# Patient Record
Sex: FEMALE | Race: OTHER | NOT HISPANIC OR LATINO | URBAN - METROPOLITAN AREA
[De-identification: names, ages, dates, MRNs, and addresses within clinical notes are randomized per-mention and may not be internally consistent; named-entity substitution may affect disease eponyms.]

---

## 2019-05-20 NOTE — H&P ADULT - NSICDXPASTSURGICALHX_GEN_ALL_CORE_FT
PAST SURGICAL HISTORY:  History of cataract extraction     History of colectomy     History of lumpectomy PAST SURGICAL HISTORY:  H/O left wrist surgery with hardware    History of      History of cataract extraction     History of colectomy     History of colostomy reversal     History of lumpectomy left breast with lymph nodes removed    History of parathyroidectomy     History of surgery right leg calcium deposit removed    History of tonsillectomy

## 2019-05-20 NOTE — H&P ADULT - HISTORY OF PRESENT ILLNESS
78yo female co right hip pain x  .   Patient presents for elective R ELLA. 78yo female co right hip pain x 1-2y. Pt. denies any accident or injury but states she has arthritis. Pt. c/o increased pain in with sitting to standing positions, but gets better once she starts to walk. Pt. takes no medications to alleviate pain due to stomach issues. Denies any PT or injections. Denies any walker assistance.    Patient presents for elective R ELLA.

## 2019-05-20 NOTE — H&P ADULT - NSICDXPASTMEDICALHX_GEN_ALL_CORE_FT
PAST MEDICAL HISTORY:  Breast cancer     Depression     Esophagitis     Essential hypertension     GERD (gastroesophageal reflux disease)     Osteoporosis PAST MEDICAL HISTORY:  Breast cancer left breast    Depression     Diverticulitis prior colostomy, since reversed    Esophagitis     Essential hypertension     GERD (gastroesophageal reflux disease)     Osteoarthritis     Osteoporosis

## 2019-05-20 NOTE — H&P ADULT - NSHPLABSRESULTS_GEN_ALL_CORE
Preop cbc, bmp, pt/inr, ptt, wnl reviewed by medical clearance.  Nasal swab DOS  Preop UA +leuks repeat DOS  preop cxr Nodular right apical pleural thickening with linear atelectasis or scarring reviewed by medical clearance  preop ekg wnl reviewed by medical clearance

## 2019-05-20 NOTE — H&P ADULT - NSHPPHYSICALEXAM_GEN_ALL_CORE
General: Alert and oriented, NAD  MSK:  Decreased ROM of right hip secondary to pain.  Remainder of PE as per medical clearance. General: Alert and oriented, NAD  MSK: RIGHT HIP: skin intact, no erythema/ecchymosis/ sts. SLT INTACT, DP/PT 2+, EHL/TA/GS 5/5.   Decreased ROM of right hip secondary to pain.  Remainder of PE as per medical clearance.

## 2019-05-20 NOTE — H&P ADULT - PROBLEM SELECTOR PLAN 1
Admit to orthopedics.  Presents for elective R ELLA  Medically optimized and cleared for surgery by Dr. Lozada

## 2019-05-21 ENCOUNTER — INPATIENT (INPATIENT)
Facility: HOSPITAL | Age: 79
LOS: 0 days | Discharge: HOME HEALTH | DRG: 470 | End: 2019-05-22
Attending: ORTHOPAEDIC SURGERY | Admitting: ORTHOPAEDIC SURGERY
Payer: MEDICARE

## 2019-05-21 VITALS
SYSTOLIC BLOOD PRESSURE: 138 MMHG | DIASTOLIC BLOOD PRESSURE: 54 MMHG | RESPIRATION RATE: 18 BRPM | OXYGEN SATURATION: 98 % | HEIGHT: 64 IN | TEMPERATURE: 98 F | HEART RATE: 54 BPM | WEIGHT: 175.05 LBS

## 2019-05-21 DIAGNOSIS — Z98.890 OTHER SPECIFIED POSTPROCEDURAL STATES: Chronic | ICD-10-CM

## 2019-05-21 DIAGNOSIS — K20.9 ESOPHAGITIS, UNSPECIFIED: ICD-10-CM

## 2019-05-21 DIAGNOSIS — M16.11 UNILATERAL PRIMARY OSTEOARTHRITIS, RIGHT HIP: ICD-10-CM

## 2019-05-21 DIAGNOSIS — M81.0 AGE-RELATED OSTEOPOROSIS WITHOUT CURRENT PATHOLOGICAL FRACTURE: ICD-10-CM

## 2019-05-21 DIAGNOSIS — Z90.89 ACQUIRED ABSENCE OF OTHER ORGANS: Chronic | ICD-10-CM

## 2019-05-21 DIAGNOSIS — Z98.49 CATARACT EXTRACTION STATUS, UNSPECIFIED EYE: Chronic | ICD-10-CM

## 2019-05-21 DIAGNOSIS — Z98.891 HISTORY OF UTERINE SCAR FROM PREVIOUS SURGERY: Chronic | ICD-10-CM

## 2019-05-21 DIAGNOSIS — Z90.49 ACQUIRED ABSENCE OF OTHER SPECIFIED PARTS OF DIGESTIVE TRACT: Chronic | ICD-10-CM

## 2019-05-21 DIAGNOSIS — K21.9 GASTRO-ESOPHAGEAL REFLUX DISEASE WITHOUT ESOPHAGITIS: ICD-10-CM

## 2019-05-21 DIAGNOSIS — F32.9 MAJOR DEPRESSIVE DISORDER, SINGLE EPISODE, UNSPECIFIED: ICD-10-CM

## 2019-05-21 DIAGNOSIS — I10 ESSENTIAL (PRIMARY) HYPERTENSION: ICD-10-CM

## 2019-05-21 LAB
ANION GAP SERPL CALC-SCNC: 11 MMOL/L — SIGNIFICANT CHANGE UP (ref 5–17)
APPEARANCE UR: CLEAR — SIGNIFICANT CHANGE UP
BACTERIA # UR AUTO: PRESENT /HPF
BILIRUB UR-MCNC: NEGATIVE — SIGNIFICANT CHANGE UP
BUN SERPL-MCNC: 17 MG/DL — SIGNIFICANT CHANGE UP (ref 7–23)
CALCIUM SERPL-MCNC: 9.1 MG/DL — SIGNIFICANT CHANGE UP (ref 8.4–10.5)
CHLORIDE SERPL-SCNC: 108 MMOL/L — SIGNIFICANT CHANGE UP (ref 96–108)
CO2 SERPL-SCNC: 22 MMOL/L — SIGNIFICANT CHANGE UP (ref 22–31)
COLOR SPEC: YELLOW — SIGNIFICANT CHANGE UP
CREAT SERPL-MCNC: 0.62 MG/DL — SIGNIFICANT CHANGE UP (ref 0.5–1.3)
DIFF PNL FLD: ABNORMAL
EPI CELLS # UR: SIGNIFICANT CHANGE UP /HPF (ref 0–5)
GLUCOSE SERPL-MCNC: 145 MG/DL — HIGH (ref 70–99)
GLUCOSE UR QL: NEGATIVE — SIGNIFICANT CHANGE UP
HCT VFR BLD CALC: 35.7 % — SIGNIFICANT CHANGE UP (ref 34.5–45)
HGB BLD-MCNC: 12 G/DL — SIGNIFICANT CHANGE UP (ref 11.5–15.5)
KETONES UR-MCNC: NEGATIVE — SIGNIFICANT CHANGE UP
LEUKOCYTE ESTERASE UR-ACNC: ABNORMAL
MCHC RBC-ENTMCNC: 32.3 PG — SIGNIFICANT CHANGE UP (ref 27–34)
MCHC RBC-ENTMCNC: 33.6 GM/DL — SIGNIFICANT CHANGE UP (ref 32–36)
MCV RBC AUTO: 96 FL — SIGNIFICANT CHANGE UP (ref 80–100)
MRSA PCR RESULT.: NEGATIVE — SIGNIFICANT CHANGE UP
NITRITE UR-MCNC: NEGATIVE — SIGNIFICANT CHANGE UP
NRBC # BLD: 0 /100 WBCS — SIGNIFICANT CHANGE UP (ref 0–0)
PH UR: 6 — SIGNIFICANT CHANGE UP (ref 5–8)
PLATELET # BLD AUTO: 156 K/UL — SIGNIFICANT CHANGE UP (ref 150–400)
POTASSIUM SERPL-MCNC: 4.2 MMOL/L — SIGNIFICANT CHANGE UP (ref 3.5–5.3)
POTASSIUM SERPL-SCNC: 4.2 MMOL/L — SIGNIFICANT CHANGE UP (ref 3.5–5.3)
PROT UR-MCNC: NEGATIVE MG/DL — SIGNIFICANT CHANGE UP
RBC # BLD: 3.72 M/UL — LOW (ref 3.8–5.2)
RBC # FLD: 12.9 % — SIGNIFICANT CHANGE UP (ref 10.3–14.5)
RBC CASTS # UR COMP ASSIST: < 5 /HPF — SIGNIFICANT CHANGE UP
S AUREUS DNA NOSE QL NAA+PROBE: POSITIVE
SODIUM SERPL-SCNC: 141 MMOL/L — SIGNIFICANT CHANGE UP (ref 135–145)
SP GR SPEC: <=1.005 — SIGNIFICANT CHANGE UP (ref 1–1.03)
TROPONIN T SERPL-MCNC: <0.01 NG/ML — SIGNIFICANT CHANGE UP (ref 0–0.01)
UROBILINOGEN FLD QL: 0.2 E.U./DL — SIGNIFICANT CHANGE UP
WBC # BLD: 7.34 K/UL — SIGNIFICANT CHANGE UP (ref 3.8–10.5)
WBC # FLD AUTO: 7.34 K/UL — SIGNIFICANT CHANGE UP (ref 3.8–10.5)
WBC UR QL: < 5 /HPF — SIGNIFICANT CHANGE UP

## 2019-05-21 PROCEDURE — 99223 1ST HOSP IP/OBS HIGH 75: CPT

## 2019-05-21 PROCEDURE — 72170 X-RAY EXAM OF PELVIS: CPT | Mod: 26

## 2019-05-21 RX ORDER — ESCITALOPRAM OXALATE 10 MG/1
0 TABLET, FILM COATED ORAL
Qty: 0 | Refills: 0 | DISCHARGE

## 2019-05-21 RX ORDER — ACETAMINOPHEN 500 MG
975 TABLET ORAL ONCE
Refills: 0 | Status: COMPLETED | OUTPATIENT
Start: 2019-05-21 | End: 2019-05-21

## 2019-05-21 RX ORDER — MAGNESIUM HYDROXIDE 400 MG/1
30 TABLET, CHEWABLE ORAL DAILY
Refills: 0 | Status: DISCONTINUED | OUTPATIENT
Start: 2019-05-21 | End: 2019-05-22

## 2019-05-21 RX ORDER — AMLODIPINE BESYLATE 2.5 MG/1
2.5 TABLET ORAL DAILY
Refills: 0 | Status: DISCONTINUED | OUTPATIENT
Start: 2019-05-21 | End: 2019-05-22

## 2019-05-21 RX ORDER — ESCITALOPRAM OXALATE 10 MG/1
5 TABLET, FILM COATED ORAL DAILY
Refills: 0 | Status: DISCONTINUED | OUTPATIENT
Start: 2019-05-21 | End: 2019-05-22

## 2019-05-21 RX ORDER — GABAPENTIN 400 MG/1
100 CAPSULE ORAL THREE TIMES A DAY
Refills: 0 | Status: DISCONTINUED | OUTPATIENT
Start: 2019-05-21 | End: 2019-05-22

## 2019-05-21 RX ORDER — ONDANSETRON 8 MG/1
4 TABLET, FILM COATED ORAL EVERY 6 HOURS
Refills: 0 | Status: DISCONTINUED | OUTPATIENT
Start: 2019-05-21 | End: 2019-05-22

## 2019-05-21 RX ORDER — PANTOPRAZOLE SODIUM 20 MG/1
40 TABLET, DELAYED RELEASE ORAL
Refills: 0 | Status: DISCONTINUED | OUTPATIENT
Start: 2019-05-21 | End: 2019-05-21

## 2019-05-21 RX ORDER — SENNA PLUS 8.6 MG/1
2 TABLET ORAL AT BEDTIME
Refills: 0 | Status: DISCONTINUED | OUTPATIENT
Start: 2019-05-21 | End: 2019-05-22

## 2019-05-21 RX ORDER — AMLODIPINE BESYLATE 2.5 MG/1
1 TABLET ORAL
Qty: 0 | Refills: 0 | DISCHARGE

## 2019-05-21 RX ORDER — OLMESARTAN MEDOXOMIL 5 MG/1
1 TABLET, FILM COATED ORAL
Qty: 0 | Refills: 0 | DISCHARGE

## 2019-05-21 RX ORDER — HYDROMORPHONE HYDROCHLORIDE 2 MG/ML
0.5 INJECTION INTRAMUSCULAR; INTRAVENOUS; SUBCUTANEOUS EVERY 4 HOURS
Refills: 0 | Status: DISCONTINUED | OUTPATIENT
Start: 2019-05-21 | End: 2019-05-22

## 2019-05-21 RX ORDER — CELECOXIB 200 MG/1
200 CAPSULE ORAL
Refills: 0 | Status: DISCONTINUED | OUTPATIENT
Start: 2019-05-21 | End: 2019-05-22

## 2019-05-21 RX ORDER — DOCUSATE SODIUM 100 MG
100 CAPSULE ORAL THREE TIMES A DAY
Refills: 0 | Status: DISCONTINUED | OUTPATIENT
Start: 2019-05-21 | End: 2019-05-22

## 2019-05-21 RX ORDER — PANTOPRAZOLE SODIUM 20 MG/1
40 TABLET, DELAYED RELEASE ORAL ONCE
Refills: 0 | Status: COMPLETED | OUTPATIENT
Start: 2019-05-21 | End: 2019-05-21

## 2019-05-21 RX ORDER — OXYCODONE HYDROCHLORIDE 5 MG/1
10 TABLET ORAL EVERY 4 HOURS
Refills: 0 | Status: DISCONTINUED | OUTPATIENT
Start: 2019-05-21 | End: 2019-05-22

## 2019-05-21 RX ORDER — CHOLECALCIFEROL (VITAMIN D3) 125 MCG
1 CAPSULE ORAL
Qty: 0 | Refills: 0 | DISCHARGE

## 2019-05-21 RX ORDER — POLYETHYLENE GLYCOL 3350 17 G/17G
17 POWDER, FOR SOLUTION ORAL DAILY
Refills: 0 | Status: DISCONTINUED | OUTPATIENT
Start: 2019-05-21 | End: 2019-05-22

## 2019-05-21 RX ORDER — SODIUM CHLORIDE 9 MG/ML
1000 INJECTION INTRAMUSCULAR; INTRAVENOUS; SUBCUTANEOUS
Refills: 0 | Status: DISCONTINUED | OUTPATIENT
Start: 2019-05-21 | End: 2019-05-22

## 2019-05-21 RX ORDER — CELECOXIB 200 MG/1
200 CAPSULE ORAL ONCE
Refills: 0 | Status: COMPLETED | OUTPATIENT
Start: 2019-05-21 | End: 2019-05-21

## 2019-05-21 RX ORDER — ASPIRIN/CALCIUM CARB/MAGNESIUM 324 MG
325 TABLET ORAL
Refills: 0 | Status: DISCONTINUED | OUTPATIENT
Start: 2019-05-21 | End: 2019-05-22

## 2019-05-21 RX ORDER — OXYCODONE HYDROCHLORIDE 5 MG/1
5 TABLET ORAL EVERY 4 HOURS
Refills: 0 | Status: DISCONTINUED | OUTPATIENT
Start: 2019-05-21 | End: 2019-05-22

## 2019-05-21 RX ORDER — ACETAMINOPHEN 500 MG
650 TABLET ORAL EVERY 6 HOURS
Refills: 0 | Status: DISCONTINUED | OUTPATIENT
Start: 2019-05-21 | End: 2019-05-22

## 2019-05-21 RX ORDER — LOSARTAN POTASSIUM 100 MG/1
50 TABLET, FILM COATED ORAL DAILY
Refills: 0 | Status: DISCONTINUED | OUTPATIENT
Start: 2019-05-21 | End: 2019-05-22

## 2019-05-21 RX ORDER — ALPRAZOLAM 0.25 MG
0 TABLET ORAL
Qty: 0 | Refills: 0 | DISCHARGE

## 2019-05-21 RX ORDER — BUPIVACAINE 13.3 MG/ML
20 INJECTION, SUSPENSION, LIPOSOMAL INFILTRATION ONCE
Refills: 0 | Status: DISCONTINUED | OUTPATIENT
Start: 2019-05-21 | End: 2019-05-22

## 2019-05-21 RX ADMIN — CELECOXIB 200 MILLIGRAM(S): 200 CAPSULE ORAL at 17:24

## 2019-05-21 RX ADMIN — Medication 325 MILLIGRAM(S): at 17:24

## 2019-05-21 RX ADMIN — GABAPENTIN 100 MILLIGRAM(S): 400 CAPSULE ORAL at 21:44

## 2019-05-21 RX ADMIN — Medication 650 MILLIGRAM(S): at 17:25

## 2019-05-21 RX ADMIN — CELECOXIB 200 MILLIGRAM(S): 200 CAPSULE ORAL at 07:24

## 2019-05-21 RX ADMIN — Medication 975 MILLIGRAM(S): at 07:25

## 2019-05-21 RX ADMIN — Medication 100 MILLIGRAM(S): at 21:44

## 2019-05-21 RX ADMIN — Medication 100 MILLIGRAM(S): at 16:56

## 2019-05-21 RX ADMIN — Medication 650 MILLIGRAM(S): at 18:17

## 2019-05-21 RX ADMIN — CELECOXIB 200 MILLIGRAM(S): 200 CAPSULE ORAL at 18:17

## 2019-05-21 RX ADMIN — Medication 75 MILLIGRAM(S): at 07:25

## 2019-05-21 RX ADMIN — PANTOPRAZOLE SODIUM 40 MILLIGRAM(S): 20 TABLET, DELAYED RELEASE ORAL at 11:22

## 2019-05-21 NOTE — PHYSICAL THERAPY INITIAL EVALUATION ADULT - GENERAL OBSERVATIONS, REHAB EVAL
Spoke to [facility], pt cleared for PT. Pt POD #0 R anterior ELLA. Pt rcvd semi supine, +SCDs, +L IV, agreeable to PT. Pt A&Ox4, reports 3/10 pain, son bedside. Pt tolerated session well, demo Roge bed mob and transfers, amb 155 ft supervision with RW. Left sitting up in bed, +SCDs, +callbell, in NAD, son present. FIM gait=5.

## 2019-05-21 NOTE — PHYSICAL THERAPY INITIAL EVALUATION ADULT - ADDITIONAL COMMENTS
Pt lives alone in house with 3 JOE (no rails but endorses fence she was holding on to), 1 flight within home. Bthroom and bedroom on first floor. Pt states friend who is retired RN will stay with her for 1 week following sx. Pt denies use of AD, home modifications, outside help in home, previous PT.

## 2019-05-21 NOTE — PHYSICAL THERAPY INITIAL EVALUATION ADULT - CRITERIA FOR SKILLED THERAPEUTIC INTERVENTIONS
rehab potential/therapy frequency/impairments found/functional limitations in following categories/predicted duration of therapy intervention/anticipated equipment needs at discharge/risk reduction/prevention/anticipated discharge recommendation

## 2019-05-21 NOTE — PHYSICAL THERAPY INITIAL EVALUATION ADULT - ASR EQUIP NEEDS DISCH PT EVAL
Pt will need RW/cane; states she has friends who have walkers not being used --follow up for d/c and may need to order cane

## 2019-05-21 NOTE — PHYSICAL THERAPY INITIAL EVALUATION ADULT - DIAGNOSIS, PT EVAL
7/11/2017  9:12 AM    Chief Complaint   Patient presents with    Letter       Noted patient request for letter to get access to her residence's elevator. Letter created. In bin in my office. 4H: Impaired Joint Mobility, Motor Function, Muscle Performance, and Range of Motion Associated with Joint Arthroplasty

## 2019-05-21 NOTE — PROGRESS NOTE ADULT - SUBJECTIVE AND OBJECTIVE BOX
Orthopaedic Surgery Post Operative Check    Procedure: right total hip resurfacing   Surgeon: Dr. Mercer     Pt comfortable without complaints, pain controlled  Denies CP, SOB, N/V, numbness/tingling    Vital Signs Last 24 Hrs  T(C): 36.8 (05-21-19 @ 06:08), Max: 36.8 (05-21-19 @ 06:08)  T(F): 98.2 (05-21-19 @ 06:08), Max: 98.2 (05-21-19 @ 06:08)  HR: 46 (05-21-19 @ 11:15) (44 - 60)  BP: 118/56 (05-21-19 @ 11:15) (112/58 - 138/54)  BP(mean): 81 (05-21-19 @ 11:15) (79 - 82)  RR: 16 (05-21-19 @ 11:15) (9 - 18)  SpO2: 93% (05-21-19 @ 11:15) (93% - 99%)  AVSS    General: Pt Alert and oriented, NAD  DSG C/D/I right hip aquacel   Pulses: DP palpable BLE   Sensation: intact and equal to BLE  Motor: EHL/FHL/TA/GS 5/5 BLE                           12.0   7.34  )-----------( 156      ( 21 May 2019 10:52 )             35.7   21 May 2019 10:50    141    |  108    |  17     ----------------------------<  145    4.2     |  22     |  0.62     Ca    9.1        21 May 2019 10:50        Post-op X-Ray: intra op fluoro utilized      A/P: 79yFemale POD#0 s/p right hip resurfacing   - Stable  - Pain Control  - DVT ppx:   - Post op abx: clindamycin   - PT, WBS: wbat  - f/u AM labs     Ortho Pager [phone]

## 2019-05-21 NOTE — PHYSICAL THERAPY INITIAL EVALUATION ADULT - ACTIVE RANGE OF MOTION EXAMINATION, REHAB EVAL
except R hip flexion 0-50 +pain/raya. upper extremity Active ROM was WNL (within normal limits)/bilateral lower extremity Active ROM was WNL (within normal limits)

## 2019-05-21 NOTE — CONSULT NOTE ADULT - SUBJECTIVE AND OBJECTIVE BOX
CC: Feeling well.   Worked with PT.   No complaints.   Urination +  Denies cp, sob, dizziness.   Rest of ROS negative.     Vital Signs Last 24 Hrs  T(C): 36.2 (21 May 2019 16:42), Max: 36.8 (21 May 2019 06:08)  T(F): 97.1 (21 May 2019 16:42), Max: 98.2 (21 May 2019 06:08)  HR: 61 (21 May 2019 16:42) (44 - 77)  BP: 116/58 (21 May 2019 16:42) (112/53 - 138/54)  BP(mean): 88 (21 May 2019 11:57) (77 - 88)  RR: 17 (21 May 2019 16:42) (9 - 22)  SpO2: 97% (21 May 2019 16:42) (93% - 99%)    PHYSICAL EXAMINATION  * General: Not in acute distress. Awake and alert. Lying comfortably in bed.  * Head: Normocephalic, atraumatic.  * HEENT: ears no discharge, eyes PERRLA, nose no discharge, throat no exudates, normal tonsils.  * Neck: no JVD, supple.  * Lungs: Clear to auscultation, no rales, no wheezes.  * Cardio: Regular rate and rhythm, no murmurs, no rubs, no gallops. Good peripheral pulses.  * Abdomen: Soft, non-tender, non-distended, tympanic to percussion, no rebound, no guarding, no rigidity. Bowel sounds present. No suprapubic or CVA tenderness.  * Extremities: Acyanotic, no edema.  * Skin: Warm and dry.  * Neuro: Alert and oriented x 3. No focal deficits. Motor strength is 5/5 throughout. Sensation intact. Cranial nerves II-XII grossly intact.                           12.0   7.34  )-----------( 156      ( 21 May 2019 10:52 )             35.7   05-21    141  |  108  |  17  ----------------------------<  145<H>  4.2   |  22  |  0.62    Ca    9.1      21 May 2019 10:50    MEDICATIONS  (STANDING):  acetaminophen   Tablet .. 650 milliGRAM(s) Oral every 6 hours  amLODIPine   Tablet 2.5 milliGRAM(s) Oral daily  aspirin enteric coated 325 milliGRAM(s) Oral two times a day  BUpivacaine liposome 1.3% Injectable (no eMAR) 20 milliLiter(s) Local Injection once  celecoxib 200 milliGRAM(s) Oral two times a day  clindamycin IVPB 900 milliGRAM(s) IV Intermittent every 8 hours  docusate sodium 100 milliGRAM(s) Oral three times a day  escitalopram 5 milliGRAM(s) Oral daily  gabapentin 100 milliGRAM(s) Oral three times a day  losartan 50 milliGRAM(s) Oral daily  polyethylene glycol 3350 17 Gram(s) Oral daily  sodium chloride 0.9%. 1000 milliLiter(s) (75 mL/Hr) IV Continuous <Continuous>    MEDICATIONS  (PRN):  aluminum hydroxide/magnesium hydroxide/simethicone Suspension 30 milliLiter(s) Oral four times a day PRN Indigestion  HYDROmorphone  Injectable 0.5 milliGRAM(s) IV Push every 4 hours PRN Severe Pain (7 - 10)  magnesium hydroxide Suspension 30 milliLiter(s) Oral daily PRN Constipation  ondansetron Injectable 4 milliGRAM(s) IV Push every 6 hours PRN Nausea and/or Vomiting  oxyCODONE    IR 5 milliGRAM(s) Oral every 4 hours PRN Mild Pain (1 - 3)  oxyCODONE    IR 10 milliGRAM(s) Oral every 4 hours PRN Moderate Pain (4 - 6)  senna 2 Tablet(s) Oral at bedtime PRN Constipation

## 2019-05-21 NOTE — PHYSICAL THERAPY INITIAL EVALUATION ADULT - PHYSICAL ASSIST/NONPHYSICAL ASSIST: STAND/SIT, REHAB EVAL
Calling with questions regarding tetanus and her son's injury-general health injury          
Regarding: If you can't remember last tetanus, can you wait a day or two to get updated shot  ----- Message from Xin Paez sent at 3/18/2018  8:16 PM CDT -----  Patient Name: Farrah Stewart  Specialist or PCP: No PCP  Pregnant (If Yes, how long?): No  Symptoms: If you can't remember last tetanus, can you wait a day or two to get updated shot  Call Back #: [phone]  Is the patient’s permanent residence located in [place], [place], or a compact State? Yes [id number]  Call Center Account #: [id number]      
verbal cues/1 person assist

## 2019-05-21 NOTE — CONSULT NOTE ADULT - CONSULT REASON
Comanagement    78yo F, PMH of HTN, HLD, breast ca, depression, GERD, diverticulitis, s/p colostomy and reversal and chronic right hip pain x 1-2y. Patient was admitted for elective R ELLA by Dr. Mercer, now POD-0. Medicine consulted for comanagement.    PAST MEDICAL & SURGICAL HISTORY:  Osteoarthritis  Diverticulitis: prior colostomy, since reversed  Osteoporosis  GERD (gastroesophageal reflux disease)  Depression  Breast cancer: left breast  Essential hypertension  Esophagitis  History of surgery: right leg calcium deposit removed  H/O left wrist surgery: with hardware  History of   History of tonsillectomy  History of parathyroidectomy  History of colostomy reversal  History of lumpectomy: left breast with lymph nodes removed  History of colectomy  History of cataract extraction    Social: Denies etoh, smoking, drugs.     Allergies: PCN, Cephalexin, clarithromycin    Home Medications:  amLODIPine 2.5 mg oral tablet: 1 tab(s) orally once a day (21 May 2019 06:32)  Benicar 20 mg oral tablet: 1 tab(s) orally once a day (21 May 2019 06:32)  Lexapro 5 mg oral tablet: orally once a day (21 May 2019 06:32)  multivitamin: 1  orally once a day (21 May 2019 06:32)  NexIUM 40 mg oral delayed release capsule: 1 cap(s) orally once a day (21 May 2019 06:32)  Prolia 60 mg/mL subcutaneous solution: subcutaneous every 3 to 6 months (21 May 2019 06:32)  Vitamin D3 2000 intl units oral tablet: 1 tab(s) orally once a day (21 May 2019 06:32)  Xanax 0.25 mg oral tablet: orally once a day, As Needed (21 May 2019 06:32)

## 2019-05-21 NOTE — PHYSICAL THERAPY INITIAL EVALUATION ADULT - LEVEL OF INDEPENDENCE: SCOOT/BRIDGE, REHAB EVAL
Quality 130: Documentation Of Current Medications In The Medical Record: Current Medications Documented stand-by assist Detail Level: Detailed

## 2019-05-21 NOTE — CONSULT NOTE ADULT - ASSESSMENT
80yo F, PMH of HTN, HLD, breast ca, depression, GERD, diverticulitis, s/p colostomy and reversal and chronic right hip pain x 1-2y. Patient was admitted for elective R ELLA by Dr. Mercer, now POD-0. Medicine consulted for comanagement.    1) Post-op state  * OOB-C  * Physical therapy evaluation  * Aggressive incentive spirometry  * Pain control and bowel regimen  * Mechanical LE ppx     2) Essential HTN  * Can restart home meds:  * c/w Benicar 20 (or hospital equivalent ~Losartan)  * C/w Amlodipine 2.5 daily    3) Depression.   * Escitalopram 5mg    4) VTE ppx.   *  bid  * c/w PPI

## 2019-05-22 VITALS
SYSTOLIC BLOOD PRESSURE: 112 MMHG | RESPIRATION RATE: 16 BRPM | OXYGEN SATURATION: 97 % | DIASTOLIC BLOOD PRESSURE: 56 MMHG | HEART RATE: 54 BPM

## 2019-05-22 LAB
ANION GAP SERPL CALC-SCNC: 9 MMOL/L — SIGNIFICANT CHANGE UP (ref 5–17)
BUN SERPL-MCNC: 14 MG/DL — SIGNIFICANT CHANGE UP (ref 7–23)
CALCIUM SERPL-MCNC: 8 MG/DL — LOW (ref 8.4–10.5)
CHLORIDE SERPL-SCNC: 108 MMOL/L — SIGNIFICANT CHANGE UP (ref 96–108)
CO2 SERPL-SCNC: 23 MMOL/L — SIGNIFICANT CHANGE UP (ref 22–31)
CREAT SERPL-MCNC: 0.61 MG/DL — SIGNIFICANT CHANGE UP (ref 0.5–1.3)
GLUCOSE SERPL-MCNC: 131 MG/DL — HIGH (ref 70–99)
HCT VFR BLD CALC: 29.6 % — LOW (ref 34.5–45)
HGB BLD-MCNC: 10 G/DL — LOW (ref 11.5–15.5)
MCHC RBC-ENTMCNC: 32.3 PG — SIGNIFICANT CHANGE UP (ref 27–34)
MCHC RBC-ENTMCNC: 33.8 GM/DL — SIGNIFICANT CHANGE UP (ref 32–36)
MCV RBC AUTO: 95.5 FL — SIGNIFICANT CHANGE UP (ref 80–100)
NRBC # BLD: 0 /100 WBCS — SIGNIFICANT CHANGE UP (ref 0–0)
PLATELET # BLD AUTO: 141 K/UL — LOW (ref 150–400)
POTASSIUM SERPL-MCNC: 3.8 MMOL/L — SIGNIFICANT CHANGE UP (ref 3.5–5.3)
POTASSIUM SERPL-SCNC: 3.8 MMOL/L — SIGNIFICANT CHANGE UP (ref 3.5–5.3)
RBC # BLD: 3.1 M/UL — LOW (ref 3.8–5.2)
RBC # FLD: 12.8 % — SIGNIFICANT CHANGE UP (ref 10.3–14.5)
SODIUM SERPL-SCNC: 140 MMOL/L — SIGNIFICANT CHANGE UP (ref 135–145)
WBC # BLD: 7.53 K/UL — SIGNIFICANT CHANGE UP (ref 3.8–10.5)
WBC # FLD AUTO: 7.53 K/UL — SIGNIFICANT CHANGE UP (ref 3.8–10.5)

## 2019-05-22 PROCEDURE — 99232 SBSQ HOSP IP/OBS MODERATE 35: CPT

## 2019-05-22 RX ORDER — PANTOPRAZOLE SODIUM 20 MG/1
40 TABLET, DELAYED RELEASE ORAL
Refills: 0 | Status: DISCONTINUED | OUTPATIENT
Start: 2019-05-22 | End: 2019-05-22

## 2019-05-22 RX ORDER — TRAMADOL HYDROCHLORIDE 50 MG/1
1 TABLET ORAL
Qty: 42 | Refills: 0
Start: 2019-05-22 | End: 2019-05-28

## 2019-05-22 RX ORDER — ESOMEPRAZOLE MAGNESIUM 40 MG/1
1 CAPSULE, DELAYED RELEASE ORAL
Qty: 0 | Refills: 0 | DISCHARGE

## 2019-05-22 RX ORDER — POLYETHYLENE GLYCOL 3350 17 G/17G
17 POWDER, FOR SOLUTION ORAL
Qty: 0 | Refills: 0 | DISCHARGE
Start: 2019-05-22

## 2019-05-22 RX ORDER — DENOSUMAB 60 MG/ML
0 INJECTION SUBCUTANEOUS
Qty: 0 | Refills: 0 | DISCHARGE

## 2019-05-22 RX ORDER — POTASSIUM CHLORIDE 20 MEQ
40 PACKET (EA) ORAL ONCE
Refills: 0 | Status: DISCONTINUED | OUTPATIENT
Start: 2019-05-22 | End: 2019-05-22

## 2019-05-22 RX ORDER — TRAMADOL HYDROCHLORIDE 50 MG/1
50 TABLET ORAL EVERY 4 HOURS
Refills: 0 | Status: DISCONTINUED | OUTPATIENT
Start: 2019-05-22 | End: 2019-05-22

## 2019-05-22 RX ORDER — CELECOXIB 200 MG/1
1 CAPSULE ORAL
Qty: 28 | Refills: 0
Start: 2019-05-22 | End: 2019-06-04

## 2019-05-22 RX ORDER — OXYCODONE HYDROCHLORIDE 5 MG/1
10 TABLET ORAL EVERY 4 HOURS
Refills: 0 | Status: DISCONTINUED | OUTPATIENT
Start: 2019-05-22 | End: 2019-05-22

## 2019-05-22 RX ORDER — PANTOPRAZOLE SODIUM 20 MG/1
1 TABLET, DELAYED RELEASE ORAL
Qty: 28 | Refills: 0
Start: 2019-05-22 | End: 2019-06-18

## 2019-05-22 RX ORDER — ASPIRIN/CALCIUM CARB/MAGNESIUM 324 MG
1 TABLET ORAL
Qty: 56 | Refills: 0
Start: 2019-05-22 | End: 2019-06-18

## 2019-05-22 RX ORDER — SENNA PLUS 8.6 MG/1
2 TABLET ORAL
Qty: 0 | Refills: 0 | DISCHARGE
Start: 2019-05-22

## 2019-05-22 RX ORDER — DOCUSATE SODIUM 100 MG
1 CAPSULE ORAL
Qty: 0 | Refills: 0 | DISCHARGE
Start: 2019-05-22

## 2019-05-22 RX ORDER — ACETAMINOPHEN 500 MG
2 TABLET ORAL
Qty: 0 | Refills: 0 | DISCHARGE
Start: 2019-05-22

## 2019-05-22 RX ORDER — OXYCODONE HYDROCHLORIDE 5 MG/1
5 TABLET ORAL EVERY 4 HOURS
Refills: 0 | Status: DISCONTINUED | OUTPATIENT
Start: 2019-05-22 | End: 2019-05-22

## 2019-05-22 RX ADMIN — CELECOXIB 200 MILLIGRAM(S): 200 CAPSULE ORAL at 06:20

## 2019-05-22 RX ADMIN — Medication 100 MILLIGRAM(S): at 05:53

## 2019-05-22 RX ADMIN — Medication 650 MILLIGRAM(S): at 05:53

## 2019-05-22 RX ADMIN — Medication 650 MILLIGRAM(S): at 00:31

## 2019-05-22 RX ADMIN — CELECOXIB 200 MILLIGRAM(S): 200 CAPSULE ORAL at 05:50

## 2019-05-22 RX ADMIN — Medication 650 MILLIGRAM(S): at 01:01

## 2019-05-22 RX ADMIN — Medication 650 MILLIGRAM(S): at 06:23

## 2019-05-22 RX ADMIN — GABAPENTIN 100 MILLIGRAM(S): 400 CAPSULE ORAL at 05:54

## 2019-05-22 RX ADMIN — LOSARTAN POTASSIUM 50 MILLIGRAM(S): 100 TABLET, FILM COATED ORAL at 05:51

## 2019-05-22 RX ADMIN — AMLODIPINE BESYLATE 2.5 MILLIGRAM(S): 2.5 TABLET ORAL at 06:23

## 2019-05-22 RX ADMIN — Medication 325 MILLIGRAM(S): at 05:52

## 2019-05-22 RX ADMIN — Medication 100 MILLIGRAM(S): at 00:32

## 2019-05-22 NOTE — PROGRESS NOTE ADULT - SUBJECTIVE AND OBJECTIVE BOX
Ortho Note    Pt comfortable without complaints, pain controlled  Denies CP, SOB, N/V, numbness/tingling     Vital Signs Last 24 Hrs  T(C): 36.1 (05-22-19 @ 09:01), Max: 36.1 (05-22-19 @ 09:01)  T(F): 97 (05-22-19 @ 09:01), Max: 97 (05-22-19 @ 09:01)  HR: 68 (05-22-19 @ 09:01) (53 - 68)  BP: 96/60 (05-22-19 @ 09:01) (96/60 - 107/53)  BP(mean): --  RR: 15 (05-22-19 @ 09:01) (15 - 16)  SpO2: 98% (05-22-19 @ 09:01) (95% - 98%)    VSS  General: A&Ox3, NAD  RLE: Aquacell DSG C/D/I  Pulses: Foot WWP; DP pulse 2+; Cap refill < 2 sec  Sensation: SILT distally and symmetric to contralateral extremity  Motor: TA/EHL/FHL/GS 5/5 and symmetric to contralateral extremity                          10.0   7.53  )-----------( 141      ( 22 May 2019 06:27 )             29.6     22 May 2019 06:27    140    |  108    |  14     ----------------------------<  131    3.8     |  23     |  0.61     Ca    8.0        22 May 2019 06:27

## 2019-05-22 NOTE — DISCHARGE NOTE PROVIDER - NSDCACTIVITY_GEN_ALL_CORE
Showering allowed/Walking - Outdoors allowed/Do not drive or operate machinery/No heavy lifting/straining/Walking - Indoors allowed/Stairs allowed

## 2019-05-22 NOTE — DISCHARGE NOTE PROVIDER - CARE PROVIDER_API CALL
Nikko Mercer)  Orthopaedic Surgery  [address] [place] [address]  Phone: [phone]  Fax: [phone]  Follow Up Time: 2 weeks

## 2019-05-22 NOTE — DISCHARGE NOTE PROVIDER - NSDCCPCAREPLAN_GEN_ALL_CORE_FT
PRINCIPAL DISCHARGE DIAGNOSIS  Diagnosis: Osteoarthritis of right hip  Assessment and Plan of Treatment: improvement after right total hip replacement      SECONDARY DISCHARGE DIAGNOSES  Diagnosis: Osteoarthritis of right hip  Assessment and Plan of Treatment: Osteoarthritis of right hip

## 2019-05-22 NOTE — DISCHARGE NOTE NURSING/CASE MANAGEMENT/SOCIAL WORK - NSDCDPATPORTLINK_GEN_ALL_CORE
You can access the [facility] Portal, offered by [facility], by registering with the following website: [email or web address]

## 2019-05-22 NOTE — DISCHARGE NOTE PROVIDER - NSDCFUADDINST_GEN_ALL_CORE_FT
Weight bear as tolerated with assistive device. No manipulation or stretching during physical therapy. Ambulation only.  No strenuous activity, heavy lifting, driving or returning to work until cleared by MD.  You may shower - dressing is water-resistant, no soaking in bathtubs.  Remove dressing on post-op day 5., then leave incision open to air. Keep incision clean and dry. Use butadeine solution on incision after showering.  Try to have regular bowel movements, take stool softener or laxative if necessary.  May take Pepcid or Zantac for upset stomach.  May take Aleve or Naproxen instead of Meloxicam/Celebrex.  Swelling may travel all the way down leg to foot, this is normal and will subside in a few weeks.  Call to schedule an appt with Dr. Mercer for follow up, if you have staples or sutures they will be removed in office.  Contact your doctor if you experience: fever greater than 101.5, chills, chest pain, difficulty breathing, redness or excessive drainage around the incision, other concerns.  Follow up with your primary care provider.

## 2019-05-22 NOTE — PROGRESS NOTE ADULT - ASSESSMENT
A/P: 79yFemale POD1 s/p R anterior ELLA  - Stable  - Pain/Nausea Control adequate  - Home meds  - AM labs -- hypoK to 3.8, otherwise WNL  - DVT ppx:  BID  - WBS: WBAT RLE  - PT: home w HPT  - Possible d/c today      Ortho Pager [phone]

## 2019-05-22 NOTE — PROGRESS NOTE ADULT - ASSESSMENT
78yo F, PMH of HTN, HLD, breast ca, depression, GERD, diverticulitis, s/p colostomy and reversal and chronic right hip pain x 1-2y. Patient was admitted for elective R ELLA by Dr. Mercer, now POD-1. Medicine consulted for comanagement.    1) Post-op state  * OOB-C  * Physical therapy evaluation  * Aggressive incentive spirometry  * Pain control and bowel regimen  * Mechanical LE ppx     2) Essential HTN home meds:  * c/w Benicar 20 (or hospital equivalent ~Losartan)  * C/w Amlodipine 2.5 daily    3) Depression.   * Escitalopram 5mg    4) VTE ppx.   *  bid  * c/w PPI

## 2019-05-22 NOTE — DISCHARGE NOTE PROVIDER - NSDCCPTREATMENT_GEN_ALL_CORE_FT
PRINCIPAL PROCEDURE  Procedure: Total resurfacing arthroplasty of right hip  Findings and Treatment: osteoarthritis

## 2019-05-23 PROCEDURE — C1776: CPT

## 2019-05-23 PROCEDURE — 84484 ASSAY OF TROPONIN QUANT: CPT

## 2019-05-23 PROCEDURE — 97161 PT EVAL LOW COMPLEX 20 MIN: CPT

## 2019-05-23 PROCEDURE — 36415 COLL VENOUS BLD VENIPUNCTURE: CPT

## 2019-05-23 PROCEDURE — 97116 GAIT TRAINING THERAPY: CPT

## 2019-05-23 PROCEDURE — 76000 FLUOROSCOPY <1 HR PHYS/QHP: CPT

## 2019-05-23 PROCEDURE — 86850 RBC ANTIBODY SCREEN: CPT

## 2019-05-23 PROCEDURE — 81001 URINALYSIS AUTO W/SCOPE: CPT

## 2019-05-23 PROCEDURE — 87641 MR-STAPH DNA AMP PROBE: CPT

## 2019-05-23 PROCEDURE — 80048 BASIC METABOLIC PNL TOTAL CA: CPT

## 2019-05-23 PROCEDURE — C1713: CPT

## 2019-05-23 PROCEDURE — 86900 BLOOD TYPING SEROLOGIC ABO: CPT

## 2019-05-23 PROCEDURE — 72170 X-RAY EXAM OF PELVIS: CPT

## 2019-05-23 PROCEDURE — 97530 THERAPEUTIC ACTIVITIES: CPT

## 2019-05-23 PROCEDURE — 86901 BLOOD TYPING SEROLOGIC RH(D): CPT

## 2019-05-23 PROCEDURE — 85027 COMPLETE CBC AUTOMATED: CPT

## 2019-05-25 DIAGNOSIS — Z90.12 ACQUIRED ABSENCE OF LEFT BREAST AND NIPPLE: ICD-10-CM

## 2019-05-25 DIAGNOSIS — Z90.49 ACQUIRED ABSENCE OF OTHER SPECIFIED PARTS OF DIGESTIVE TRACT: ICD-10-CM

## 2019-05-25 DIAGNOSIS — Z88.0 ALLERGY STATUS TO PENICILLIN: ICD-10-CM

## 2019-05-25 DIAGNOSIS — M17.11 UNILATERAL PRIMARY OSTEOARTHRITIS, RIGHT KNEE: ICD-10-CM

## 2019-05-25 DIAGNOSIS — I10 ESSENTIAL (PRIMARY) HYPERTENSION: ICD-10-CM

## 2019-05-25 DIAGNOSIS — Z88.1 ALLERGY STATUS TO OTHER ANTIBIOTIC AGENTS STATUS: ICD-10-CM

## 2019-05-25 DIAGNOSIS — Z88.6 ALLERGY STATUS TO ANALGESIC AGENT: ICD-10-CM

## 2019-05-25 DIAGNOSIS — M81.0 AGE-RELATED OSTEOPOROSIS WITHOUT CURRENT PATHOLOGICAL FRACTURE: ICD-10-CM

## 2019-05-25 DIAGNOSIS — Z85.3 PERSONAL HISTORY OF MALIGNANT NEOPLASM OF BREAST: ICD-10-CM

## 2019-05-25 DIAGNOSIS — K21.9 GASTRO-ESOPHAGEAL REFLUX DISEASE WITHOUT ESOPHAGITIS: ICD-10-CM

## 2019-05-25 DIAGNOSIS — F32.9 MAJOR DEPRESSIVE DISORDER, SINGLE EPISODE, UNSPECIFIED: ICD-10-CM

## 2023-12-03 NOTE — DISCHARGE NOTE PROVIDER - HOSPITAL COURSE
Admitted 5/21/19    Surgery right total hip replacement 5/21/19    Criss-op Antibiotics    Pain control    DVT prophylaxis    OOB/Physical Therapy
Speaking Coherently

## 2025-06-02 NOTE — DISCHARGE NOTE PROVIDER - PROVIDER TOKENS
Subjective     Autumn Juárez is a 64 y.o. female who presents with Annual Exam            HPI      Here for annual exam   Sees derm annually uses sunscreen   Sees eye exam [facility]   Sees dentist teeth cleaning twice per year  Sees gynecology  at [facility] on vivelle patch and prometrium which has helped with anxiety and more energy and sleep seems better, and on vaginal estrogen twice per week   No hearing changes  Sees chiropractor on occasion but not on a regular basis, occasional massage therapy  Patient is retired, 2 sons live in town, keeps active goes for walks 1 mile a day during the summer months and goes to gym 4 days per week for 2 hours at a time weight training, no joint pains or aches normally. Still competes national squat, deadlift and bench for power lifting.   Patient tries to eat healthy, limits carbs but does eat cottage cheese and yogurt, tries to incorporate healthy fats in her diet as well, good protein intake with lean sources of protein, whey protein, creatine.  Avoid sweets, candies, processed foods, no alcohol, no soda, coffee 2 cups a day, good water intake.  Tries to get 7 hours of sleep nightly.  Hypertension on losartan 25 mg, Toprol 25 mg, blood pressure at home, brings her blood pressure log for the past year, blood pressures she takes it twice a month, systolic blood pressures typically 115-130, diastolic blood pressure 60-80, heart rate 50-65 she does have a whitecoat component.  No chest pain, shortness of breath, palpitations, lightheadedness with activity or exercise.  Dyslipidemia on Lipitor.  No back, knee, hip pain with weight lifting, no regular NSAIDs  Medications, allergies, medical history, surgical history, social history, family history  reviewed and updated    Current Medications[1]               dry eye  9/26/16  note  10/25/16 ophthalmology note     Dyslipidemia  8/10 chol 195,trig 63,hdl 62,ldl 120  8/11 chol 199,trig 63,hdl 54,ldl 132  5/13 chol  184,trig 56,hdl 58,ldl 115  4/18/14 chol 194,trig 71,hdl 60,ldl 120  5/5/14 chol 207,trig 124,hdl 57,ldl 125,LDL-P 1380,HDL-P 34,LP-IR <25; 10 year risk 2.1%  8/31/15 chol 201,trig 154,hdl 49,ldl 121  9/17/16 chol 197,trig 76,hdl 68,ldl 114  9/5/17 chol 216,trig 80,hdl 59,ldl 141  10/11/19 chol 225,trig 94,hdl 58,ldl 148; 10 year risk 5.9%   2/23/21 chol 211,trig 86,hdl 66,ldl 130; 10 year risk 4.75%  6/8/22 chol 279,trig 99,hdl 67,ldl 192; 10 year risk 8.5%  6/13/22 start lipitor 20 mg   7/13/22 chol 159,trig 78,hdl 56,ldl 87 on lipitor 20 mg  4/28/23 chol 222,trig 85,hdl 64,ldl 141 on lipitor 20 mg  8/16/23 chol 157,trig 63,hdl 57,ldl 87 on lipitor 20 mg  6/9/24 chol 168,trig 70,hdl 51,ldl 103 on lipitor 20 mg      history of tonsiillar hypertrophy  12/10 ENT  note nasopharyngoscopy tonsillar hypertrophy, possible lingual tonsillitis, could not rule out underlying mass, will do course of abx and repeat nasopharyngoscopy an MRI tongue base to be done afterwards and  1/11 dr.van grarison note ENT repeat nasopharyngoscopy showed improved tonsillar hypertrophy and tonsillitis after antibiotics      Hypertension  4/28/14 on toprol and start asa  5/2/14 urine mac <0.5 on toprol 25 mg  9/17/16 urine mac <0.7 on toprol 25 mg   1/12/18 urine mac <0.7 on toprol 25 mg  10/8/19 echo normal LV function, EF 65%, no evidence of LVH continue to monitor home blood pressures on toprol notify us if systolic above 140 consistently  10/11/19 renin 0.5 and aldosterone 11.0, plasma metanephrine and normetanephrine normal on toprol 25 mg  2/23/21 urine mac<3.0 on toprol 25 mg  4/14/22 add losartan 25 mg to toprol 25 mg  6/8/22 urine mac<1.2 on losartan 25 mg and toprol 25 mg  6/28/22 echo EF 60 to 65%, normal diastolic function, RVSP 25  4/28/23 urine mac<1.2 on losartan 25 mg and toprol 25 mg  6/9/24 urine mac<1.2 on losartan 25 mg and toprol 25 mg     impaired glucose metabolism  4/28/23 A1c 5.8%  8/16/23 A1c 5.6%  6/9/24 A1c  "5.9%    osteopenia  2/17/20 dexa LS-1.9,hip-2.1  2/23/21 vit d 96 on 5000 daily, change to every qod  5/17/22 dexa LS-1.5,hip-2.1   6/8/22 vit d 81  4/28/23 vit d 63  5/21/24 dexa LS-1.3,hip-2.1  6/9/24 vit d 66     Preventative health  10/11/19 hep c ab negative  12/5/19 shingrix second   12/15/22 tdap  4/24/23  [facility] note   5/21/24 dexa LS-1.3,hip-2.1  5/21/24 mammogram heterogeneously dense breast tissue offered screening breast ultrasound  6/9/24 vit d 66  6/19/24 colon per [facility] polyps pathology pending  8/21/24 sonocine      vaginal atrophy  11/17/20 topical HRT per gynecology  12/15/22 topical estradiol twice per week          Problem List[2]    Depression Screening  Little interest or pleasure in doing things?  0 - not at all  Feeling down, depressed , or hopeless? 0 - not at all  Patient Health Questionnaire Score: 0                Patient Care Team:  Master Dash M.D. as PCP - General      ROS           Objective     BP (!) 144/80   Pulse 62   Temp 37.4 °C (99.4 °F) (Temporal)   Ht 1.66 m (5' 5.35\")   Wt 64.6 kg (142 lb 6.4 oz)   LMP 06/01/2011   SpO2 100%   BMI 23.44 kg/m²      Physical Exam  Vitals and nursing note reviewed.   Constitutional:       General: She is not in acute distress.     Appearance: Normal appearance.   HENT:      Head: Normocephalic and atraumatic.      Right Ear: External ear normal.      Left Ear: External ear normal.      Nose: Nose normal.   Eyes:      Conjunctiva/sclera: Conjunctivae normal.   Cardiovascular:      Rate and Rhythm: Normal rate and regular rhythm.   Pulmonary:      Effort: Pulmonary effort is normal. No respiratory distress.      Breath sounds: Normal breath sounds.   Abdominal:      General: There is no distension.   Musculoskeletal:      Cervical back: Neck supple.   Skin:     Coloration: Skin is not jaundiced.      Findings: No bruising.   Neurological:      General: No focal deficit present.      Mental Status: She is alert. "   Psychiatric:         Mood and Affect: Mood normal.         Behavior: Behavior normal.                                  Assessment & Plan       Assessment  #1 annual exam    #2 hypertension elevated blood pressure here today but blood pressures at home normal, she has a whitecoat component continues on losartan, Toprol, last echo 3 years ago normal LV function    #3 dyslipidemia continues on Lipitor 6/9/24 chol 168,trig 70,hdl 51,ldl 103 on lipitor 20 mg, following a good nutritional program    #4 postmenopausal on estrogen patch and Prometrium per [facility]necology    #5 history of dense breast on mammogram no personal or family history of breast cancer    #6 skin lesions followed by dermatology uses sunscreen    #7 postmenopausal bone loss last bone density 5/21/24 dexa LS-1.3,hip-2.1    #8 impaired glucose metabolism      Plan  #1 health maintenance reviewed and updated    #2 old records from gynecology    #3 updated medication list, to reflect the estrogen patch and Prometrium    #4 old records skin cancer derm, use sunscreen and hat outdoors    #5 labs    #6 mammogram and sonocine ordered, bone density next year, up-to-date on colon cancer screening    #7 she will get the PCV 21 at the pharmacy    #8 continue her good nutrition and exercise program    #9 asked her to complete her advance directive and power of  paperwork and bring that here so we can make a copy    #10 HRT per gynecology    #11 whitecoat hypertension, home blood pressure stable, continue Toprol and losartan    #12 EKG reviewed sinus bradycardia rate 55 no acute changes, heart rate at home runs 50s to 65    #13 will notify with lab results, follow-up 1 year              [1]   Current Outpatient Medications   Medication Sig Dispense Refill    estradiol (VIVELLE DOT) 0.05 MG/24HR PATCH BIWEEKLY APPLY 1 TRANSDERMAL PATCH TWICE WEEKLY      progesterone (PROMETRIUM) 100 MG Cap TAKE 2 TABLET BY MOUTH AT BEDTIME      losartan (COZAAR) 25 MG Tab  TAKE 1 TABLET BY MOUTH EVERY  Tablet 0    atorvastatin (LIPITOR) 20 MG Tab TAKE 1 TABLET BY MOUTH EVERY DAY IN THE EVENING 90 Tablet 3    metoprolol SR (TOPROL XL) 25 MG TABLET SR 24 HR TAKE 1 TABLET BY MOUTH EVERY DAY 90 Tablet 3    azelastine (OPTIVAR) 0.05 % ophthalmic solution Administer 1 Drop into both eyes 1 time a day as needed (Watery eyes). 6 mL 3    estradiol (ESTRACE) 0.1 MG/GM vaginal cream Insert 1 g into the vagina every day. Daily X 7 days then 2-3 X weekly  Indications: Vulvovaginal Atrophy 42.5 g 3    CREATINE PO Take  by mouth.      Multiple Vitamins-Minerals (MULTIVITAMIN PO) Take  by mouth.      Cholecalciferol (VITAMIN D PO) Take  by mouth.      Cetirizine HCl (ZYRTEC ALLERGY PO) Take  by mouth.      FISH OIL by Does not apply route.      Calcium Carbonate (CALCIUM 500 PO) Take  by mouth.       No current facility-administered medications for this visit.   [2]   Patient Active Problem List  Diagnosis    Hypertension    Preventative health care    Dyslipidemia    History of tonsillar hypertrophy    Dry eye syndrome    Vaginal atrophy    Osteopenia    Impaired glucose metabolism    Post-menopausal      PROVIDER:[TOKEN:[[id number]],FOLLOWUP:[2 weeks]]